# Patient Record
Sex: MALE | Race: ASIAN | NOT HISPANIC OR LATINO | URBAN - METROPOLITAN AREA
[De-identification: names, ages, dates, MRNs, and addresses within clinical notes are randomized per-mention and may not be internally consistent; named-entity substitution may affect disease eponyms.]

---

## 2016-12-17 RX ORDER — SODIUM CHLORIDE 9 MG/ML
3 INJECTION INTRAMUSCULAR; INTRAVENOUS; SUBCUTANEOUS ONCE
Qty: 0 | Refills: 0 | Status: DISCONTINUED | OUTPATIENT
Start: 2017-01-06 | End: 2017-01-07

## 2016-12-17 RX ORDER — SODIUM CHLORIDE 9 MG/ML
1000 INJECTION, SOLUTION INTRAVENOUS
Qty: 0 | Refills: 0 | Status: DISCONTINUED | OUTPATIENT
Start: 2017-01-06 | End: 2017-01-07

## 2017-01-05 NOTE — ASU PATIENT PROFILE, ADULT - VISION (WITH CORRECTIVE LENSES IF THE PATIENT USUALLY WEARS THEM):
Glasses for reading and distance/Normal vision: sees adequately in most situations; can see medication labels, newsprint

## 2017-01-06 ENCOUNTER — OUTPATIENT (OUTPATIENT)
Dept: OUTPATIENT SERVICES | Facility: HOSPITAL | Age: 37
LOS: 1 days | Discharge: HOME | End: 2017-01-06
Payer: COMMERCIAL

## 2017-01-06 VITALS
TEMPERATURE: 98 F | HEART RATE: 72 BPM | SYSTOLIC BLOOD PRESSURE: 112 MMHG | OXYGEN SATURATION: 100 % | RESPIRATION RATE: 18 BRPM | DIASTOLIC BLOOD PRESSURE: 80 MMHG

## 2017-01-06 VITALS
DIASTOLIC BLOOD PRESSURE: 85 MMHG | RESPIRATION RATE: 14 BRPM | OXYGEN SATURATION: 98 % | HEIGHT: 68.5 IN | HEART RATE: 61 BPM | TEMPERATURE: 98 F | WEIGHT: 173.06 LBS | SYSTOLIC BLOOD PRESSURE: 116 MMHG

## 2017-01-06 DIAGNOSIS — J38.1 POLYP OF VOCAL CORD AND LARYNX: ICD-10-CM

## 2017-01-06 PROCEDURE — 88305 TISSUE EXAM BY PATHOLOGIST: CPT | Mod: 26

## 2017-01-06 RX ORDER — FAMOTIDINE 10 MG/ML
0 INJECTION INTRAVENOUS
Qty: 0 | Refills: 0 | COMMUNITY

## 2017-01-06 RX ORDER — SODIUM CHLORIDE 9 MG/ML
1000 INJECTION, SOLUTION INTRAVENOUS
Qty: 0 | Refills: 0 | Status: DISCONTINUED | OUTPATIENT
Start: 2017-01-06 | End: 2017-01-07

## 2017-01-06 NOTE — ASU DISCHARGE PLAN (ADULT/PEDIATRIC). - PROCEDURE
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

## 2017-01-06 NOTE — BRIEF OPERATIVE NOTE - PROCEDURE
Direct laryngoscopy, operative  01/06/2017  with microscope and injection (Dexamethasone)  Active  LINDA

## 2017-01-06 NOTE — ASU DISCHARGE PLAN (ADULT/PEDIATRIC). - NOTIFY
Numbness, tingling/Fever greater than 101/Increased Irritability or Sluggishness/Persistent Nausea and Vomiting/Unable to Urinate/Excessive Diarrhea/Pain not relieved by Medications/Bleeding that does not stop/Swelling that continues/Inability to Tolerate Liquids or Foods

## 2017-01-06 NOTE — ASU DISCHARGE PLAN (ADULT/PEDIATRIC). - MEDICATION SUMMARY - MEDICATIONS TO TAKE
I will START or STAY ON the medications listed below when I get home from the hospital:    Lortab 5/325 oral tablet  -- 1 tab(s) by mouth every 6 hours, As Needed for pain  -- Indication: For Polyp of vocal cord or larynx

## 2017-01-06 NOTE — ASU DISCHARGE PLAN (ADULT/PEDIATRIC). - ACTIVITY LEVEL
no heavy lifting/no sports/gym/no intercourse/no exercise no heavy lifting/no sports/gym/No speaking/no exercise/no intercourse

## 2017-01-06 NOTE — ASU DISCHARGE PLAN (ADULT/PEDIATRIC). - NURSING INSTRUCTIONS
You were given IV Tylenol for pain management.  Please DO NOT take tylenol for the next 6-8 hours (until ____4pm___ ). Please do not exceed 3000mg in 24hours.  Includes Prescription pain meds which may contain Tylenol (acetaminophen)

## 2017-01-06 NOTE — ASU DISCHARGE PLAN (ADULT/PEDIATRIC). - MEDICATION SUMMARY - MEDICATIONS TO STOP TAKING
I will STOP taking the medications listed below when I get home from the hospital:    famotidine 20 mg oral tablet  --  by mouth twice

## 2017-01-09 LAB — SURGICAL PATHOLOGY STUDY: SIGNIFICANT CHANGE UP

## 2021-05-21 NOTE — ASU PATIENT PROFILE, ADULT - CAREGIVER RELATION TO PATIENT
ALLIE pt orientation d/t expressive aphasia, VSS on RA.  Pt is able to ambulate independently with steady gait.  Pt reports 0/10 pain, medicated per MAR.       Assessment/description of ears? Bilateral pink, intact, and blanching  Which preventative measures are in place for the ears? n/a    Assessment/description of elbows?  Bilateral pink, intact, and blanching    Which preventative measures are in place for the elbows?  Pt turns independent and ambulates frequently waffle mattress overlay    Assessment/description of sacrum?  Pink, intact, and blanching    Which preventative measures are in place for the sacrum?  Pt turns independent and ambulates frequently  Waffle mattress overlay    Assessment/description of heels?  Bilateral pink, intact, and blanching    Which preventative measures are in place for the heels? Pt turns independent and ambulates frequently, waffle mattress overlay       Which devices are in place? [email or web address]  Description of skin under devices:  Intact, pink, and blanching  Which preventative measures are in place under devices?  Skin assessment, ensure securing band is not too tight    Other:   wife